# Patient Record
Sex: FEMALE | Race: WHITE
[De-identification: names, ages, dates, MRNs, and addresses within clinical notes are randomized per-mention and may not be internally consistent; named-entity substitution may affect disease eponyms.]

---

## 2018-09-10 ENCOUNTER — HOSPITAL ENCOUNTER (EMERGENCY)
Dept: HOSPITAL 14 - H.ER | Age: 33
LOS: 1 days | Discharge: HOME | End: 2018-09-11
Payer: SELF-PAY

## 2018-09-10 VITALS — TEMPERATURE: 98.5 F | OXYGEN SATURATION: 100 %

## 2018-09-10 VITALS — BODY MASS INDEX: 32.1 KG/M2

## 2018-09-10 DIAGNOSIS — Z3A.01: ICD-10-CM

## 2018-09-10 DIAGNOSIS — R10.2: ICD-10-CM

## 2018-09-10 DIAGNOSIS — R07.9: ICD-10-CM

## 2018-09-10 DIAGNOSIS — O26.91: Primary | ICD-10-CM

## 2018-09-10 LAB
BASOPHILS # BLD AUTO: 0.1 K/UL (ref 0–0.2)
BASOPHILS NFR BLD: 0.8 % (ref 0–2)
BUN SERPL-MCNC: 6 MG/DL (ref 7–17)
CALCIUM SERPL-MCNC: 9.7 MG/DL (ref 8.4–10.2)
EOSINOPHIL # BLD AUTO: 0.1 K/UL (ref 0–0.7)
EOSINOPHIL NFR BLD: 1.4 % (ref 0–4)
ERYTHROCYTE [DISTWIDTH] IN BLOOD BY AUTOMATED COUNT: 13.7 % (ref 11.5–14.5)
GFR NON-AFRICAN AMERICAN: > 60
HGB BLD-MCNC: 14.3 G/DL (ref 12–16)
LYMPHOCYTES # BLD AUTO: 2.9 K/UL (ref 1–4.3)
LYMPHOCYTES NFR BLD AUTO: 28.7 % (ref 20–40)
MCH RBC QN AUTO: 29.9 PG (ref 27–31)
MCHC RBC AUTO-ENTMCNC: 34.2 G/DL (ref 33–37)
MCV RBC AUTO: 87.4 FL (ref 81–99)
MONOCYTES # BLD: 0.7 K/UL (ref 0–0.8)
MONOCYTES NFR BLD: 6.9 % (ref 0–10)
NEUTROPHILS # BLD: 6.4 K/UL (ref 1.8–7)
NEUTROPHILS NFR BLD AUTO: 62.2 % (ref 50–75)
NRBC BLD AUTO-RTO: 0.1 % (ref 0–0)
PLATELET # BLD: 289 K/UL (ref 130–400)
PMV BLD AUTO: 8.6 FL (ref 7.2–11.7)
RBC # BLD AUTO: 4.8 MIL/UL (ref 3.8–5.2)
WBC # BLD AUTO: 10.3 K/UL (ref 4.8–10.8)

## 2018-09-10 NOTE — ED PDOC
HPI: Abdomen


Time Seen by Provider: 09/10/18 21:38


Chief Complaint (Nursing): Abdominal Pain


Chief Complaint (Provider): Lower abdominal pain


History Per: Patient


History/Exam Limitations: no limitations


Onset/Duration Of Symptoms: Days (x2)


Current Symptoms Are (Timing): Still Present


Associated Symptoms: Nausea, Vomiting


Additional Complaint(s): 





32 year old female presents to the ED complaining of constant lower abdominal 

pain for 2 days. Patient is also reporting lower back pain for a couple of 

weeks and has intermittent chest pain that comes and goes.  She is also 

complaining of nausea and vomiting that she has had every day because she is 6 

weeks pregnant.  She also states having mild intermittent headaches and a mild 

headache this afternoon which she took Motrin for.  She indicates she feels 

better and headache is mild.  Denies dizziness, weakness, numbness, difficulty 

breathing, and vaginal bleeding.  Patient is , is 6 weeks pregnant, and has 

not had an ultrasound done for this pregnancy.





PMD: none





Past Medical History


Reviewed: Historical Data, Nursing Documentation, Vital Signs


Vital Signs: 


 Last Vital Signs











Temp  98.5 F   09/10/18 21:26


 


Pulse  69   18 00:57


 


Resp  16   09/10/18 21:26


 


BP  114/73   09/10/18 21:26


 


Pulse Ox  100   18 00:57














- Medical History


PMH: No Chronic Diseases





- Surgical History


Surgical History: 





- Family History


Family History: States: Unknown Family Hx





- Home Medications


Home Medications: 


 Ambulatory Orders











 Medication  Instructions  Recorded


 


Ca/Cholecalciferol/Fe/Folic 1 1 tab PO DAILY 09/04/15





[Basic's Prenatal Vitamins]  


 


Benzocaine/Menthol USP [Dermoplast] 1 sprays TOP PRN PRN #0 aero 09/08/15


 


Ibuprofen [Motrin Tab] 600 mg PO Q8 #0 tab 09/08/15














- Allergies


Allergies/Adverse Reactions: 


 Allergies











Allergy/AdvReac Type Severity Reaction Status Date / Time


 


No Known Allergies Allergy   Verified 09/10/18 21:26














Review of Systems


ROS Statement: Except As Marked, All Systems Reviewed And Found Negative


Respiratory: Negative for: Other (Difficulty breathing)


Gastrointestinal: Positive for: Nausea, Vomiting, Abdominal Pain (lower)


Genitourinary Female: Negative for: Vaginal Bleeding


Musculoskeletal: Positive for: Back Pain (lower)


Neurological: Positive for: Headache.  Negative for: Weakness, Numbness, 

Dizziness





Physical Exam





- Reviewed


Nursing Documentation Reviewed: Yes


Vital Signs Reviewed: Yes





- Physical Exam


Appears: Positive for: Non-toxic, No Acute Distress


Head Exam: Positive for: ATRAUMATIC, NORMOCEPHALIC


Skin: Positive for: Normal Color, Warm, Dry


Eye Exam: Positive for: Normal appearance


Neck: Positive for: Normal, Painless ROM


Cardiovascular/Chest: Positive for: Regular Rate, Rhythm.  Negative for: Murmur


Respiratory: Positive for: Normal Breath Sounds.  Negative for: Wheezing, 

Respiratory Distress


Gastrointestinal/Abdominal: Positive for: Tenderness (suprapubic)


Back: Positive for: Normal Inspection.  Negative for: L CVA Tenderness, R CVA 

Tenderness


Extremity: Positive for: Normal ROM


Neurologic/Psych: Positive for: Alert, Oriented.  Negative for: Motor/Sensory 

Deficits





- Laboratory Results


Result Diagrams: 


 09/10/18 22:05





 09/10/18 22:05





- ECG


ECG Rhythm: Positive for: Normal QRS, Sinus Rhythm.  Negative for: ST/T Changes


Rate: 69


O2 Sat by Pulse Oximetry: 100 (RA)


Pulse Ox Interpretation: Normal





- Progress


Re-evaluation Time: 00:53


Condition: Re-examined, Improved





Medical Decision Making


Medical Decision Making: 





Initial Impression: Abdominal pain; pregnancy with associated chest pain.  

Differentials include ectopic pregnancy or other complication such as 

threatened  or UTI.  For chest pain, differentials include ACS which is 

less likely or PE.





Initial Plan: 


--ECG


--BMP


--Troponin


--ED urine pregnancy


--ED urine dipstick


--CBC


--Tylenol 650mg PO


--Duplex US


--OB transvaginal US





00:23


Duplex US


FINDINGS:


Right deep veins: Unremarkable. No DVT in the right common femoral, femoral, 

proximal deep


femoral or popliteal veins. The veins demonstrate normal color flow, are 

normally compressible, with


normal phasic flow and/or augmentation response.


Right superficial veins: Unremarkable. No thrombus in the visualized right 

great saphenous vein.


Left deep veins: Unremarkable. No DVT in the left common femoral, femoral, 

proximal deep femoral


or popliteal veins. The veins demonstrate normal color flow, are normally 

compressible, with normal


phasic flow and/or augmentation response.


Left superficial veins: Unremarkable. No thrombus in the visualized left great 

saphenous vein.


Soft tissues: No acute findings. No popliteal cyst.


IMPRESSION:


There is no DVT





00:24


OB transvaginal US


FINDINGS:


Gestation: There is a single live intrauterine pregnancy in a sonographic 

gestational age 6 weeks 3


days, [place] May 3, 2019. Embryonic heart rate 117 beats per minute.


Placenta/amniotic fluid: No visible subchorionic hemorrhage.


Uterus/cervix: Cervical length is 3.2 cm and the internal os is closed. No 

myometrial mass.


Ovaries: There is a likely corpus luteum cyst of the right ovary measuring a 

maximum of 1.4 cm.


Normal Doppler flow of the ovaries. No mass.


Free fluid: No free fluid.


IMPRESSION:


1. There is a single live intrauterine pregnancy in a sonographic gestational 

age 6 weeks 3 days,


[place] May 3, 2019.


2. No visible subchorionic hemorrhage.


3. There is a likely corpus luteum cyst of the right ovary measuring a maximum 

of 1.4 cm.





--------------------------------------------------------------------------------

-----------------


Scribe Attestation:


Documented by Tom Marrero acting as a scribe for Bry Chandler MD.





Provider Scribe Attestation:


All medical record entries made by the Scribe were at my direction and 

personally dictated by me. I have reviewed the chart and agree that the record 

accurately reflects my personal performance of the history, physical exam, 

medical decision making, and the department course for this patient. I have 

also personally directed, reviewed, and agree with the discharge instructions 

and disposition.





Disposition





- Clinical Impression


Clinical Impression: 


 Abdominal pain during pregnancy, Chest pain








- Patient ED Disposition


Is Patient to be Admitted: No


Doctor Will See Patient In The: Office


Counseled Patient/Family Regarding: Studies Performed, Diagnosis, Need For 

Followup





- Disposition


[facility] 


[facility]


Disposition: Routine/Home


Disposition Time: 00:54


Condition: GOOD


Additional Instructions: 





ELOINA MCKEON, thank you for letting us take care of you today. Your provider 

was Bry Chandler MD and you were treated for 6 WKS PREG HEADACHE. The 

emergency medical care you received today was directed at your acute symptoms. 

If you were prescribed any medication, please fill it and take as directed. It 

may take several days for your symptoms to resolve. Return to the Emergency 

Department if your symptoms worsen, do not improve, or if you have any other 

problems.





Please contact your doctor or call one of the physicians/clinics you have been 

referred to that are listed on the Patient Visit Information form that is 

included in your discharge packet. Bring any paperwork you were given at 

discharge with you along with any medications you are taking to your follow up 

visit. Our treatment cannot replace ongoing medical care by a primary care 

provider outside of the emergency department.





Thank you for allowing the [facility] team to be part of your care today.








If you had an X-Ray or CT scan: A Radiologist will review the ED reading if any 

change in treatment is needed we will contact you.***





If you had a blood, urine, or wound culture: It will take several days for the 

results, if any change in treatment is needed we will contact you.***





If you had an STI test: It will take 48 hours for the results. Please call 

after 1 week if you have not heard back.***


Instructions:  Threatened Miscarriage, Chest Pain (DC)


Print Language: Singaporean





- POA


Present On Arrival: None

## 2018-09-11 VITALS — DIASTOLIC BLOOD PRESSURE: 51 MMHG | HEART RATE: 74 BPM | RESPIRATION RATE: 20 BRPM | SYSTOLIC BLOOD PRESSURE: 102 MMHG

## 2018-09-11 NOTE — CARD
--------------- APPROVED REPORT --------------





Date of service: 09/10/2018



EKG Measurement

Heart [id number]

OH 98P8

[id number]

[id number]

[id number]



<Conclusion>

Sinus rhythm with short OH

ST & T wave abnormality, consider anterior ischemia

Abnormal ECG

## 2018-09-11 NOTE — US
Date of service: 



09/10/2018



PROCEDURE:  Bilateral lower extremity venous duplex Doppler. 



HISTORY:

leg pain pregnant



COMPARISON:

None available. 



TECHNIQUE:

Bilateral common femoral, superficial femoral, popliteal and 

posterior tibial veins were evaluated. Flow was assessed with color 

Doppler, compressibility, assessment of phasic flow and augmentation 

response. 



FINDINGS:



COMMON FEMORAL VEIN:

Right CFV:  Unremarkable.



Left CFV:  Unremarkable.



SUPERFICIAL FEMORAL VEIN:

Right SFV: Unremarkable.



Left SFV:  Unremarkable.



POPLITEAL VEIN:

Right Popliteal:  Unremarkable.



Left Popliteal:  Unremarkable.



POSTERIOR TIBIAL VEIN:

Right PTV: Unremarkable.



Left PTV: Unremarkable.



OTHER FINDINGS:

None.



IMPRESSION:

No evidence of deep venous thrombosis.



________________________________________________



Concordant results (preliminary interpretation) provided by [facility].



Procedure Completed: 23:31



Preliminary ([facility]) Report: Dictated and Authenticated: 00:23.



Final Interpretation: 12:35 September 11, 2018.

## 2018-09-11 NOTE — US
Date of service: 



09/10/2018



PROCEDURE:  First trimester fetal ultrasound



HISTORY:

Lower abdominal pain. 



COMPARISON:

None available.



TECHNIQUE:

Standard protocol for this study/examination.



FINDINGS:

LMP: 07/04/2018



Prior examinations from the current pregnancy: None



TECHNIQUE: Real-time 2D imaging, duplex and color Doppler.



FINDINGS:



Cardiac activity: Present



Rate: 117 BPM



Measurements:



Crown rump length: 0.57   cm



Gestational age based on CRL   6 weeks 2 days 



Gestational age 6 weeks 4 days based on gestational sac measurement 

2.1 cm



Gestational age derived from  LMP:   9 weeks 5 days



[place] based on LMP: 04/10/2019.



[place] based on biometry: 05/30/2019.



Yolk sac  identified 



Cervix: No Cervical abnormalities: Negative examination for cervical 

dilatation or effacement. 



Closed cervix measuring 3.23 cm



Subchorionic hemorrhage: None 



UTERUS: 5.3 x 6.1 x 7.4 cm.



ADNEXA:



Right: 1.9 x 2.4 x 3.1 cm.  Simple cyst 1.3 x 1.4 cm Normal Doppler 

arterial waveform documented.



Left:   1.5 x 1.9 x 2.5 cm.  Normal Doppler arterial waveform 

documented



Fluid in the cul-de-sac: 



IMPRESSION:

Six weeks 3 days live intrauterine gestation. 



________________________________________________



Concordant results (preliminary interpretation) provided by [facility].



Procedure Completed: 23:15



Preliminary ([facility]) Report: Dictated and Authenticated: 00:24.



Final Interpretation: 12:38. September 11, 2018.

## 2018-09-17 ENCOUNTER — HOSPITAL ENCOUNTER (EMERGENCY)
Dept: HOSPITAL 14 - H.ER | Age: 33
Discharge: HOME | End: 2018-09-17
Payer: COMMERCIAL

## 2018-09-17 VITALS
DIASTOLIC BLOOD PRESSURE: 70 MMHG | RESPIRATION RATE: 16 BRPM | TEMPERATURE: 98.1 F | SYSTOLIC BLOOD PRESSURE: 120 MMHG | HEART RATE: 76 BPM

## 2018-09-17 VITALS — OXYGEN SATURATION: 98 %

## 2018-09-17 VITALS — BODY MASS INDEX: 27.4 KG/M2

## 2018-09-17 DIAGNOSIS — O20.0: Primary | ICD-10-CM

## 2018-09-17 NOTE — ED PDOC
HPI: Abdomen


Time Seen by Provider: 18 08:01


Chief Complaint (Nursing): Female Genitourinary


Chief Complaint (Provider): abdominal pain and vaginal discharge


History Per: Patient


History/Exam Limitations: no limitations


Onset/Duration Of Symptoms: Days (x2)


Current Symptoms Are (Timing): Still Present


Quality Of Discomfort: Cramping


Associated Symptoms: denies: Urinary Symptoms (dusyria or frequency)


Additional Complaint(s): 





Michelle Dowd is a 32 year old female, with no significant past medical history

, who presents to the emergency department complaining of a pink discharge 

associated with mild lower abdominal cramping onset for x2 days. Patient was 

seen last week for lower abdominal pain. She is known to have an IUD 

approximately for x7 weeks. She denies any fever, chills, dysuria or frequency. 

No further medical complaints.





PMD: None provided. 





Past Medical History


Reviewed: Historical Data, Nursing Documentation, Vital Signs


Vital Signs: 


 Last Vital Signs











Temp  98 F   18 07:35


 


Pulse  80   18 07:35


 


Resp  20   18 07:35


 


BP  123/77   18 07:35


 


Pulse Ox  98   18 08:21














- Medical History


PMH: No Chronic Diseases





- Surgical History


Surgical History: 





- Family History


Family History: States: Unknown Family Hx





- Social History


Current smoker - smoking cessation education provided: No


Alcohol: None


Drugs: Denies





- Immunization History


Hx Tetanus Toxoid Vaccination: No


Hx Influenza Vaccination: No


Hx Pneumococcal Vaccination: No





- Home Medications


Home Medications: 


 Ambulatory Orders











 Medication  Instructions  Recorded


 


Ca/Cholecalciferol/Fe/Folic 1 1 tab PO DAILY 09/04/15





[Basic's Prenatal Vitamins]  


 


Benzocaine/Menthol USP [Dermoplast] 1 sprays TOP PRN PRN #0 aero 09/08/15


 


Ibuprofen [Motrin Tab] 600 mg PO Q8 #0 tab 09/08/15














- Allergies


Allergies/Adverse Reactions: 


 Allergies











Allergy/AdvReac Type Severity Reaction Status Date / Time


 


No Known Allergies Allergy   Verified 18 08:00














Review of Systems


ROS Statement: Except As Marked, All Systems Reviewed And Found Negative


Constitutional: Negative for: Fever, Chills


Gastrointestinal: Positive for: Abdominal Pain (mild lower cramping)


Genitourinary Female: Positive for: Vaginal Discharge (pink).  Negative for: 

Dysuria, Frequency





Physical Exam





- Reviewed


Nursing Documentation Reviewed: Yes


Vital Signs Reviewed: Yes





- Physical Exam


Appears: Positive for: No Acute Distress


Head Exam: Positive for: ATRAUMATIC, NORMOCEPHALIC


Skin: Positive for: Normal Color, Warm, Dry


Eye Exam: Positive for: Normal appearance


Neck: Positive for: Painless ROM


Cardiovascular/Chest: Positive for: Regular Rate, Rhythm.  Negative for: Murmur


Respiratory: Positive for: Normal Breath Sounds.  Negative for: Respiratory 

Distress


Gastrointestinal/Abdominal: Positive for: Normal Exam, Soft.  Negative for: 

Tenderness


Pelvic Exam: Negative for: Active Bleeding, Mass, Tender Adnexa


Extremity: Positive for: Normal ROM (upper and lower extremities).  Negative for

: Deformity, Swelling


Neurologic/Psych: Positive for: Alert, Oriented





- ECG


O2 Sat by Pulse Oximetry: 98 (RA)


Pulse Ox Interpretation: Normal





Medical Decision Making


Medical Decision Making: 





Time: 08:01


Initial Plan:





--Type and screen


--Beta-HCG, Quantitative


--Urine dipstick


--Reevaluation











--------------------------------------------------------------------------------

-----





Scribe Attestation:


Documented by Bobo Will, acting as a scribe for Hilario Land MD.





Provider Scribe Attestation:


All medical record entries made by the Scribe were at my direction and 

personally dictated by me. I have reviewed the chart and agree that the record 

accurately reflects my personal performance of the history, physical exam, 

medical decision making, and the department course for this patient. I have 

also personally directed, reviewed, and agree with the discharge instructions 

and disposition.





Disposition





- Clinical Impression


Clinical Impression: 


 Threatened miscarriage








- Patient ED Disposition


Is Patient to be Admitted: No


Counseled Patient/Family Regarding: Studies Performed, Diagnosis, Need For 

Followup, Rx Given





- Disposition


Referrals: 


[facility] [Outside]


Disposition: Routine/Home


Disposition Time: 11:53


Condition: FAIR


Instructions:  Threatened Miscarriage


Forms:  [employer] (English)


Print Language: Faroese

## 2019-04-24 ENCOUNTER — HOSPITAL ENCOUNTER (EMERGENCY)
Dept: HOSPITAL 14 - H.EROB2 | Age: 34
Discharge: HOME | End: 2019-04-24
Payer: MEDICAID

## 2019-04-24 VITALS — DIASTOLIC BLOOD PRESSURE: 61 MMHG | TEMPERATURE: 97.5 F | HEART RATE: 72 BPM | SYSTOLIC BLOOD PRESSURE: 100 MMHG

## 2019-04-24 VITALS — BODY MASS INDEX: 27.4 KG/M2

## 2019-04-24 DIAGNOSIS — R10.2: ICD-10-CM

## 2019-04-24 DIAGNOSIS — O26.93: Primary | ICD-10-CM

## 2019-04-24 DIAGNOSIS — Z3A.39: ICD-10-CM

## 2019-04-24 DIAGNOSIS — O24.410: ICD-10-CM

## 2019-04-24 NOTE — OBHP
===========================

Datetime: 2019 12:25

===========================

   

IP Adm Impression:  Term, intrauterine pregnancy

IP Admit Plan:  Observation/Evaluation; Discharge home

Admit Comment, IP Provider:  HPI: Michelle is a 33 year old  at 39.1 with GDMA1 who is here complain
ing of painful contractions that began at 8 this morning. No vaginal bleeding, LOF, reporting good fe
dionte movement. 

      

   Pregnancy History

   3 previous NSVDs

      

   Pregnancy Problems

   Diet controlled gestational diabetes, well controlled

      

   PMH

   Denies

      

   PSH

   Denies

      

   Medications

   PNV

      

   Allergies

   NKDA

      

   Social

   No tobacco, alcohol or drug use

      

   Family History

   Not significant

      

   PHYSICAL EXAM

   Vitals reviewed

   Prenatal labs unvailable

      

   ASSESSMENT/PLAN: 33 year old  here at 39.1 for rule out labor. Patient is minimally dilated wi
th sparse contractions, offered discharge vs observation and patient would like to stay. Reactive NST
. 

      

      

   Update 14:00 - patient is feeling better and requesting discharge, no repeat cervical exam since c
ontractions had improved. NST remained reactive. Patient has induction scheduled for tomorrow morning
. Plan discussed with attending Dr. Chu. 

      

   Cecelia Aguilar MD

   OB Fellow

      

   OB Hospitalist Addendum: Pt seen by me.  Agree w/ above.  37 yo  at 39+4 wks w/ GDM, contro
lled w/ diet w/ false labor.  NST reactive.  Pt discharged home and told to return to hospital tomorr
ow for schduled induction of labor.  (ES)  

Abdomen - PN:  Normal

Lungs - PN:  Normal

Heart - PN:  Normal

Neurologic - PN:  Normal

HEENT - PN:  Normal

General - PN:  Normal

Fetal Presentation-Admit:  Vertex

IP Fetus A Comments:  Reactive NST

FHR - Baseline A Provider:  125

Contraction Comments Provider:  Q10-15

Gestation - Est Wks by US:  39.1

EGA AdmitDate IP:  39.1

Vital Signs Provider:  Reviewed; Within Normal Limits

IP Chief Complaint:  Uterine contractions

NICHD Variability Prov Fetus A:  Moderate 6-25bpm

NICHD Accel Fetus A IP Provider:  15X15

FHR Category Provider Fetus A:  Category I

NICHD Decel Fetus A IP Provider:  None

Dilatation, Provider:  1

Effacement, Provider:  20

Station, Provider:  -3

Genitourinary Exam:  Normal

## 2019-04-24 NOTE — OBDCSUM
===========================

Datetime: 04/24/2019 14:17

===========================

   

Discharged to, Provider:  Home

Follow up at, Provider:  [facility]

Disch Instr Activity:  Normal activity; May be up to bathroom; May be up for meals; May Shower

Disch Instr Diet:  Regular

Discharge Time:  04/24/2019 14:25

Follow up in weeks, Provider:  GANESH at 8am 04/25/2019

Disch Referrals:  None

Discharge Diagnosis Prov Other:  false labor at 39+1 wks

## 2019-04-25 ENCOUNTER — HOSPITAL ENCOUNTER (INPATIENT)
Dept: HOSPITAL 14 - H.EROB2 | Age: 34
LOS: 3 days | Discharge: HOME | DRG: 560 | End: 2019-04-28
Attending: OBSTETRICS & GYNECOLOGY | Admitting: OBSTETRICS & GYNECOLOGY
Payer: MEDICAID

## 2019-04-25 VITALS — BODY MASS INDEX: 29.7 KG/M2

## 2019-04-25 DIAGNOSIS — Z3A.39: ICD-10-CM

## 2019-04-25 LAB
BASOPHILS # BLD AUTO: 0.1 K/UL (ref 0–0.2)
BASOPHILS NFR BLD: 1.5 % (ref 0–2)
EOSINOPHIL # BLD AUTO: 0.2 K/UL (ref 0–0.7)
EOSINOPHIL NFR BLD: 2.4 % (ref 0–4)
ERYTHROCYTE [DISTWIDTH] IN BLOOD BY AUTOMATED COUNT: 15.3 % (ref 11.5–14.5)
HGB BLD-MCNC: 13 G/DL (ref 12–16)
LYMPHOCYTES # BLD AUTO: 2.2 K/UL (ref 1–4.3)
LYMPHOCYTES NFR BLD AUTO: 28.7 % (ref 20–40)
MCH RBC QN AUTO: 30.8 PG (ref 27–31)
MCHC RBC AUTO-ENTMCNC: 33.2 G/DL (ref 33–37)
MCV RBC AUTO: 92.9 FL (ref 81–99)
MONOCYTES # BLD: 0.5 K/UL (ref 0–0.8)
MONOCYTES NFR BLD: 6.3 % (ref 0–10)
NEUTROPHILS # BLD: 4.7 K/UL (ref 1.8–7)
NEUTROPHILS NFR BLD AUTO: 61.1 % (ref 50–75)
NRBC BLD AUTO-RTO: 0.2 % (ref 0–0)
PLATELET # BLD: 192 K/UL (ref 130–400)
PMV BLD AUTO: 11.6 FL (ref 7.2–11.7)
RBC # BLD AUTO: 4.2 MIL/UL (ref 3.8–5.2)
WBC # BLD AUTO: 7.7 K/UL (ref 4.8–10.8)

## 2019-04-25 PROCEDURE — 4A1HXCZ MONITORING OF PRODUCTS OF CONCEPTION, CARDIAC RATE, EXTERNAL APPROACH: ICD-10-PCS | Performed by: OBSTETRICS & GYNECOLOGY

## 2019-04-26 NOTE — OBDS
===================================

DELIVERY PERSONNEL

===================================

   

Delivery Doctor:  KASH Aguilera MD

Circulator:  Kristy Vizcarra RN

Anesthesiologist:  Dr. Lawler 

   

===================================

MATERNAL INFORMATION

===================================

   

Delivery Anesthesia:  Epidural

Medications in Delivery:  Pitocin 30

Estimated  Blood Loss (ml):  150

Placenta Cultured:  No

Maternal Complications:  None

Provider Comments:  33 year old  admitted at 39.2 for IOL secondary to GDMA1. Received pitocin ad
ministration, progressed to  at 09:23 of live male infact, position LOAover intact perineum with 
epidural anesthesia. Infant was placed on maternal abdomen and delayed cord clamping was performed. A
pgars 9_9, no excessive resuscitation required. No meconium or nuchal cord. Spontaneous delivery of p
lacenta with 3-vessel cord. Perineum intact. EBL 50cc. Mom and infant are in stable condition and claudia
l be recovered in L_D. Attending Dr. Aguilera was present for delivery. 

      

   Cecelia Aguilar MD

   OB Fellow

   

===================================

LABOR SUMMARY

===================================

   

EDC:  2019 00:00

No. Babies in Womb:  1

 Attempted:  No (Annotations: Data stored by CPN on behalf of user)

Labor Anesthesia:  None (Annotations: Data stored by CPN on behalf of user)

   

===================================

LABOR INFORMATION

===================================

   

Reason for Induction:  Other

Reason for Induction Other:  GDM

Complete Dilatation:  2019 08:35

Cervical Ripening Agents:  Cervidil

Oxytocin:  N/A

Group B Beta Strep:  Negative

Steroids Given:  None

Reason Steroids Not Administered:  Not Applicable

   

===================================

MEMBRANES

===================================

   

Membranes Rupture Method:  Artificial (Annotations: Data stored by CPN on behalf of user)

Rupture of Membranes:  2019 09:21

Length of Rupture (hrs):  0.03

Amniotic Fluid Color:  Clear (Annotations: Data stored by CPN on behalf of user)

Amniotic Fluid Amount:  Small (Annotations: Data stored by CPN on behalf of user)

Amniotic Fluid Odor:  Normal (Annotations: Data stored by CPN on behalf of user)

   

===================================

STAGES OF LABOR

===================================

   

Stage 2 hrs:  0

Stage 2 min:  48

Stage 3 hrs:  0

Stage 3 min:  3

   

===================================

VAGINAL DELIVERY

===================================

   

Episiotomy:  None

Laceration Extension:  N/A

Laceration Type:  None

Initial Vag Sponge Count:  10

Final Vag Sponge Count:  10

Initial Vag Sharps Count:  0

Final Vag Sharps Count:  0

Sponge Count Correct:  Yes

Sharps Count Correct:  N/A

   

===================================

BABY A INFORMATION

===================================

   

Infant Delivery Date/Time:  2019 09:23

Method of Delivery:  Vaginal

Born in Route :  No

:  N/A

Forceps:  N/A

Vacuum Extraction:  N/A

Shoulder Dystocia :  No

   

===================================

SHOULDER DYSTOCIA BABY A

===================================

   

Infant Delivery Date/Time:  2019 09:23

   

===================================

PRESENTATION/POSITION BABY A

===================================

   

Presentation:  Cephalic

Cephalic Presentation:  Vertex

Breech Presentation:  N/A

   

===================================

PLACENTA INFORMATION BABY A

===================================

   

Placenta Delivery Time :  2019 09:26

Placenta Method of Delivery:  Spontaneous

Placenta Status:  Delivered

   

===================================

APGAR SCORES BABY A

===================================

   

Heart Rate 1 min:  >100 bpm

Resp Effort 1 min:  Good Cry

Reflex Irritability 1 min:  Cough or Sneeze or Pulls Away

Muscle Tone 1 min:  Active Motion

Color 1 min:  Body Pink, Extremities Blue

Resuscitation Effort 1 min:  Tactile Stimulation

APGAR SCORE 1 MIN:  9

Heart Rate 5 min:  >100 bpm

Resp Effort 5 min:  Good Cry

Reflex Irritability 5 min:  Cough or Sneeze or Pulls Away

Muscle Tone 5 min:  Active Motion

Color 5 min:  Body Pink, Extremities Blue

Resuscitation Effort 5 min:  Tactile Stimulation

APGAR SCORE 5 MIN:  9

   

===================================

INFANT INFORMATION BABY A

===================================

   

Gestational Age at Delivery:  39.0

Gestational Status:  Term

Infant Outcome :  Liveborn

Infant Condition :  Stable

Infant Sex:  Male

   

===================================

IDENTIFICATION/MEDS BABY A

===================================

   

ID Band Location:  Left Leg; Left Arm

   

===================================

WEIGHT/LENGTH BABY A

===================================

   

Infant Birthweight (gms):  3539

Infant Weight (lb):  7

Infant Weight (oz):  13

   

===================================

CORD INFORMATION BABY A

===================================

   

No. Cord Vessels:  3

Nuchal Cord :  N/A

Nuchal Cord Other:  n/a

True Knot:  n/a 

Cord Blood Taken:  N/A

Infant Suction:  None

   

===================================

ASSESSMENT BABY A

===================================

   

Infant Complications:  None

Physical Findings at Delivery:  Within Normal Limits

Infant Respirations:  Appears Normal

Neonatologist/ALS Called :  No

Infant Care By:  Curry

Transferred To:  Remains with Mother

## 2019-04-26 NOTE — OBADHP
===========================

Datetime: 2019 06:52

===========================

   

Fetal Presentation-Admit:  [place]

FHR - Baseline A Provider:  150

Gestation - Est Wks by US:  39.3

Vital Signs Provider:  Reviewed; Within Normal Limits

NICHD Variability Prov Fetus A:  Moderate 6-25bpm

NICHD Accel Fetus A IP Provider:  [id number]

FHR Category Provider Fetus A:  Category I

Dilatation, Provider:  4

Effacement, Provider:  90

Station, Provider:  -3

   

===========================

Datetime: 2019 10:00

===========================

   

Admit Comment, IP Provider:  HPI: 32 YO  with IUP at EGA 39.2 Wks as per LMP 18, EDC  presents today for an scheduled induction due GDM. Patient denies VB, CONTX, and LOF. Patient end
orses +FM.

      

   ROS: Unremarkable, except as per HPI. Patient also denies HA, CP, SOB, dysuria, fever, chills or o
ther acute medical complaint at present. 

   Prenatal Care Provider: Dr GERMAN Aguilar at [facility]

   OBGYN:  x3NSVD Patient reports Gestational DM controlled with diet.  First pregnancy , 
2006, 2015-induction of labor due to [place].

   PMH: Denies

   FMH: None

   SocialHx: Denies Tobacco/ETOH/Rec drug use.

   SURG: None

   Allergies: NKA

   MEDS: Prenatal Vit

   LABS: HIV neg, RPR neg, GN/ CL neg, GBS neg 3/27, Rubella positive

      

   PE

   GEN: NAD, appears comfortable

   HEENT: NCAT

   RESP: CTA b/l

   CV: RRR, S1S2 present, no murmurs

   Abdomen: Gravid

   EXT: No edema

   [place]: isolated CONTX 

   FHR: Reassuring 140s

      

   A/P

   31 YO  with IUP at EGA 39.2 wks. presenting for Induction due to GDM.

   Impression: Schedule IOL

   Plan

   -Admit to L_D

   -Maternal VS monitoring

   -FHR monitoring

   -Initiate IOL protocol

      

   Case discussed with attending

   MD Geoffrey PGY1

      

Abdomen - PN:  Normal

Breast - PN:  Not Done

Lungs - PN:  Normal

Heart - PN:  Normal

General - PN:  Normal

Comments, ACOG Physical Exam:  see triage note

IP Prenatal Hx Assessment:  The Prenatal History has been Reviewed and is Current

IP Chief Complaint:  Scheduled induction of labor

NICHD Decel Fetus A IP Provider:  None

EGA AdmitDate IP:  39.2

IP Adm Impression:  Term, intrauterine pregnancy

IP Admit Plan:  Admit to unit; Initiate labor induction protocol

   

===========================

Datetime: 2019 12:25

===========================

   

Neurologic - PN:  Normal

HEENT - PN:  Normal

IP Fetus A Comments:  Reactive NST

Contraction Comments Provider:  [id number]

Genitourinary Exam:  Normal

## 2019-04-27 NOTE — OBPPN
===========================

Datetime: 2019 08:09

===========================

   

PP Pain Prov:  Within normal limits

PP Nausea Prov:  Denies

PP Flatus Prov:  Yes

PP BM Prov:  No

PP Breasts Prov:  Not Done

PP Heart Prov:  Normal

PP Lungs Prov:  Normal

PP Abdomen/Uterus Prov:  Normal

PP Lochia Prov:  Not Done

PP Vulva/Perineum Prov:  Not Done

PP Extremities Prov:  Normal

PP Breastfeeding Progress Prov:  Normal

PP Impression Prov:  Normal postpartum progression

PP Plan Prov:  Continue present management

PP Progress Note Prov:  33 year old  s/p NVD at 39.2 after IOL secondary to GDMA1. Patient seen a
nd examined at bedside this morning. Patient has no complaints, denies pain at this time. Patient is 
ambulating w/o difficulties. Pt is breast and bottle feeding, she is tolerating regular diet. Lochia 
less than menses in volume. +Flatus, -BM. Denies fevers, chills, dizziness, chest pain, SOB, N/V/D, h
ematuria or dysuria.

      

   VS: wnl

   Gen: NAD 

   HEENT: NCAT

   Cardio: + S1S2, RRR

   Lungs: CTA B/L, no wheezes

   Abd: soft, appropriate tenderness to palpation, + BS, Uterus firm below level of umbilicus

   Ext: No edema, calves non tender 

      

   Assessment: 33 year old  s/p NVD at 39.2 after IOL secondary to GDMA1, PPD 1, with normal post
partum progression.

      

   Plan:

   -Breastfeeding and ambulation encouraged.

   - Ibuprofen 600 mg 1 tab Q 6h PRN mild pain 

   -Acetaminophen 650 PO Q6h PRN pain

   -Continue prenatal vitamin

      

   Case discussed with attending

   A MD Sole PGY1

      

   Addendum by Dr. Aguilera: I have evaluated the patient independently and I agree with the above

Vital Signs Provider PP:  Reviewed; Within Normal Limits

## 2019-04-28 VITALS
HEART RATE: 58 BPM | SYSTOLIC BLOOD PRESSURE: 104 MMHG | OXYGEN SATURATION: 99 % | TEMPERATURE: 98.2 F | RESPIRATION RATE: 20 BRPM | DIASTOLIC BLOOD PRESSURE: 56 MMHG

## 2019-04-28 NOTE — OBPPN
===========================

Datetime: 2019 07:34

===========================

   

PP Pain Prov:  Within normal limits

PP Nausea Prov:  Denies

PP Flatus Prov:  Yes

PP BM Prov:  No

PP Breasts Prov:  Not Done

PP Heart Prov:  Normal

PP Lungs Prov:  Normal

PP Abdomen/Uterus Prov:  Normal

PP Lochia Prov:  Normal

PP Vulva/Perineum Prov:  Not Done

PP CVA Tenderness Prov:  Normal

PP Extremities Prov:  Normal

PP Breastfeeding Progress Prov:  Normal

PP Impression Prov:  Normal postpartum progression

PP Plan Prov:  Continue present management

PP Progress Note Prov:  33 year old  s/p NVD at 39.2 after IOL secondary to GDMA1, PPD2. Patient 
seen and examined at bedside this morning. Patient has no complaints, denies pain at this time. Patie
nt is ambulating w/o difficulties. Pt is breast and bottle feeding, she is tolerating regular diet. L
ochia less than menses in volume. +Flatus, -BM. Denies fevers, chills, dizziness, chest pain, SOB, N/
V/D, hematuria or dysuria.

      

   VS: wnl

   Gen: NAD 

   HEENT: NCAT

   Cardio: + S1S2, RRR

   Lungs: CTA B/L, no wheezes

   Abd: soft, appropriate tenderness to palpation, + BS, Uterus firm below level of umbilicus

   Ext: No edema, calves non tender 

      

   Assessment: 33 year old  s/p NVD at 39.2 after IOL secondary to GDMA1, PPD 2, with normal post
partum progression.

      

   Plan:

   -Breastfeeding and ambulation encouraged.

   -Ibuprofen 600 mg 1 tab Q 6h PRN mild pain 

   -Acetaminophen 650 PO Q6h PRN pain

   -Continue prenatal vitamin

      

   Case discussed with attending

   MD Shayy PGY1

      

   Addendum by Dr. Aguilera: I have evaluated the patient independently and I agree with the above

IP PP Procedures:  None

Vital Signs Provider PP:  Reviewed; Within Normal Limits

## 2019-04-28 NOTE — OBDCSUM
===========================

Datetime: 2019 07:37

===========================

   

Discharged to, Provider:  Home

[facility]

Disch Instr Activity:  Normal activity; May Shower

Disch Instr Diet:  Regular

Discharge Diagnosis, Provider:  Term Pregnancy Delivered

Discharge Time:  2019 10:00

Follow up in weeks, Provider:  6 weeks

Disch Referrals:  None

Disch Activity Restrictions:  No exercising; No sexual activity; Nothing in vagina - [place], [place]

Discharge Comment, Provider:  33 year old  s/p NVD after IOL secondary to GDMA1. Delivered viable
 baby male on 19 with EGA: 39.3 weeks.

      

   : Male, 3539gm, APGAR 9/9

      

   Post-Partum D/C Summary: No complications during post-partum period. Lochia less than menses in vo
lume. Pt able to pass gas, ambulate and pass urine. Tolerate regular diet, no HA, CP, SOB, N/V, fever
 or other acute complaint at this time. Fundus firm below umbilicus level. Pt is hemodynamically stab
le.

      

   Discharge Instructions given to patient:

   Encourage breast feeding

   PNV 1 tab PO daily

   May take Ibuprofen 600mg Q 6h prn for mild-mod pain if needed

   ED precautions: If excessive bleeding, pain that does not get relief, fever >100.4, palpitations, 
SOB, CP or other concerning symptom go to the ED

   PT was urged if feeling sad, mood swing, depression, neglect of baby, suicidal thoughts, homicidal
 thoughts go to ER or call 911 for help

   Pt should go to her Primary care doctor if experiencing difficulty with breast feeding

   F/U in 4-6 week for PP visit with [facility].

      

   Case discussed with attending

   Chana Cantu, PGY1

      

Contraception after Delivery:  Undecided